# Patient Record
(demographics unavailable — no encounter records)

---

## 2025-04-11 NOTE — HISTORY OF PRESENT ILLNESS
[FreeTextEntry1] : 28 yo female here for follow up and c/o terrible allergies, would like allergy shot taking 5 nursing classes, staying busy, also caring for her 2 dgtrs, sleeping well at night, eating throughout the day pt stopped depo provera due to feeling pregnant, bloated, spotting states following w/  neuro, taking nurtec

## 2025-04-11 NOTE — PHYSICAL EXAM
[No Acute Distress] : no acute distress [Well Developed] : well developed [Normal Sclera/Conjunctiva] : normal sclera/conjunctiva [EOMI] : extraocular movements intact [Normal Outer Ear/Nose] : the outer ears and nose were normal in appearance [No JVD] : no jugular venous distention [Normal] : normal rate, regular rhythm, normal S1 and S2 and no murmur heard [Coordination Grossly Intact] : coordination grossly intact [Normal Gait] : normal gait [Normal Affect] : the affect was normal [Alert and Oriented x3] : oriented to person, place, and time [Normal Insight/Judgement] : insight and judgment were intact

## 2025-05-08 NOTE — HISTORY OF PRESENT ILLNESS
[FreeTextEntry1] : lymphadenopathy [de-identified] : 28 yo F presenting for LAD groin, posterior cephalic, and armpit.  notes on/off for the past couple of months.  + malaise. no fever, chills.  lymph no posterior neck - appears 2 weeks ago then shrunk but still there and left arm pit

## 2025-05-08 NOTE — HISTORY OF PRESENT ILLNESS
[FreeTextEntry1] : lymphadenopathy [de-identified] : 26 yo F presenting for LAD groin, posterior cephalic, and armpit.  notes on/off for the past couple of months.  + malaise. no fever, chills.  lymph no posterior neck - appears 2 weeks ago then shrunk but still there and left arm pit